# Patient Record
Sex: MALE | Race: WHITE | NOT HISPANIC OR LATINO | Employment: FULL TIME | URBAN - NONMETROPOLITAN AREA
[De-identification: names, ages, dates, MRNs, and addresses within clinical notes are randomized per-mention and may not be internally consistent; named-entity substitution may affect disease eponyms.]

---

## 2022-04-09 ENCOUNTER — OFFICE VISIT (OUTPATIENT)
Dept: FAMILY MEDICINE CLINIC | Facility: CLINIC | Age: 57
End: 2022-04-09

## 2022-04-09 VITALS
TEMPERATURE: 97.8 F | BODY MASS INDEX: 21.7 KG/M2 | SYSTOLIC BLOOD PRESSURE: 124 MMHG | OXYGEN SATURATION: 98 % | WEIGHT: 169.1 LBS | HEIGHT: 74 IN | HEART RATE: 71 BPM | DIASTOLIC BLOOD PRESSURE: 82 MMHG

## 2022-04-09 DIAGNOSIS — L98.9 SKIN LESION: Primary | ICD-10-CM

## 2022-04-09 PROBLEM — Z87.442 HISTORY OF URINARY STONE: Status: ACTIVE | Noted: 2020-07-16

## 2022-04-09 PROCEDURE — 99213 OFFICE O/P EST LOW 20 MIN: CPT | Performed by: FAMILY MEDICINE

## 2022-04-09 NOTE — PROGRESS NOTES
Date: 2022   Patient Name: Davey Ruiz  : 1965   MRN: [id number]     Chief Complaint:    Chief Complaint   Patient presents with   • Rash     Left upper thigh, skin lesion, patient noticed 2 days ago       History of Present Illness: Davey Ruiz is a 56 y.o. male who is here today for And lesion.  He reports that he first noticed it being tender and rubbing on his close 3 days ago.  It is a fleshy pedunculated mole with a black center that appears to be a scab.  He denies known insect or tick bite.  He denies drainage or surrounding erythema.           Review of Systems:   Review of Systems   Constitutional: Negative for fatigue.   Eyes: Negative for blurred vision.   Respiratory: Negative for cough, chest tightness and shortness of breath.    Cardiovascular: Negative for chest pain.   Gastrointestinal: Negative for abdominal pain, constipation, diarrhea, nausea and vomiting.   Skin: Positive for skin lesions. Negative for rash and wound.   Neurological: Negative for dizziness, tremors and headache.   Psychiatric/Behavioral: Negative for depressed mood. The patient is not nervous/anxious.        Past Medical History: History reviewed. No pertinent past medical history.    Past Surgical History:   Past Surgical History:   Procedure Laterality Date   • COLONOSCOPY         Family History:   Family History   Problem Relation Age of Onset   • Alzheimer's disease Mother    • Skin cancer Mother    • Colon cancer Father        Social History:   Social History     Socioeconomic History   • Marital status:      Spouse name: Tata   Tobacco Use   • Smoking status: Never Smoker   • Smokeless tobacco: Never Used   Vaping Use   • Vaping Use: Never used   Substance and Sexual Activity   • Alcohol use: Never   • Drug use: Never   • Sexual activity: Defer       Medications:   No current outpatient medications on file.    Allergies:   No Known Allergies      Physical Exam:  Vital Signs:   Vitals:     "04/09/22 0906   BP: 124/82   BP Location: Right arm   Patient Position: Sitting   Cuff Size: Adult   Pulse: 71   Temp: 97.8 °F (36.6 °C)   TempSrc: Temporal   SpO2: 98%   Weight: 76.7 kg (169 lb 1.6 oz)   Height: 188 cm (74\")     Body mass index is 21.71 kg/m².     Physical Exam  Vitals and nursing note reviewed.   Constitutional:       Appearance: Normal appearance.   HENT:      Head: Normocephalic.   Eyes:      Conjunctiva/sclera: Conjunctivae normal.   Pulmonary:      Effort: Pulmonary effort is normal.   Skin:     General: Skin is warm.      Comments: Fleshy pedunculated mole on left proximal inner thigh 2 mm in diameter with central black scabbing.  No purulent drainage or surrounding erythema.   Neurological:      Mental Status: He is alert and oriented to person, place, and time.   Psychiatric:         Mood and Affect: Mood normal.         Behavior: Behavior normal.           Assessment/Plan:   Diagnoses and all orders for this visit:    1. Skin lesion (Primary)  Assessment & Plan:  Unclear etiology but appears to be a fleshy mole with central excoriation potentially from rubbing on clothes.  Used silver nitrate stick today.  If no improvement by early next week patient will come back in for shaving of the lesion.           Follow Up:   Return if symptoms worsen or fail to improve.    Veronica Claire, DO  [facility]    "

## 2022-04-09 NOTE — ASSESSMENT & PLAN NOTE
Unclear etiology but appears to be a fleshy mole with central excoriation potentially from rubbing on clothes.  Used silver nitrate stick today.  If no improvement by early next week patient will come back in for shaving of the lesion.

## 2022-07-23 ENCOUNTER — OFFICE VISIT (OUTPATIENT)
Dept: FAMILY MEDICINE CLINIC | Facility: CLINIC | Age: 57
End: 2022-07-23

## 2022-07-23 VITALS
SYSTOLIC BLOOD PRESSURE: 120 MMHG | OXYGEN SATURATION: 99 % | WEIGHT: 159.2 LBS | DIASTOLIC BLOOD PRESSURE: 74 MMHG | HEIGHT: 74 IN | BODY MASS INDEX: 20.43 KG/M2 | HEART RATE: 72 BPM

## 2022-07-23 DIAGNOSIS — Q82.9 SKIN ANOMALY: Primary | ICD-10-CM

## 2022-07-23 PROCEDURE — 99213 OFFICE O/P EST LOW 20 MIN: CPT | Performed by: FAMILY MEDICINE

## 2022-07-23 NOTE — PROGRESS NOTES
"Chief Complaint  Suspicious Skin Lesion (Left leg)    Subjective          Davey Ruiz presents to [facility]  Patient comes in today saying that he has a spot on his upper leg that was treated few months ago he said got better so he went away and then it began to come back he said few days ago he was playing tennis and then noticed afterward it started turning black and becoming painful he denies any other diffident problems at this time      Objective   Vital Signs:   /74   Pulse 72   Ht 188 cm (74\")   Wt 72.2 kg (159 lb 3.2 oz)   SpO2 99%   BMI 20.44 kg/m²     Body mass index is 20.44 kg/m².    Review of Systems   Constitutional: Negative.    HENT: Negative for congestion, dental problem, ear discharge, ear pain and sore throat.    Respiratory: Negative for apnea, chest tightness and shortness of breath.    Gastrointestinal: Negative for constipation and nausea.   Endocrine: Negative for polyuria.   Genitourinary: Negative for difficulty urinating.   Musculoskeletal: Negative for arthralgias and gait problem.   Skin: Positive for skin lesions. Negative for rash.   Hematological: Negative for adenopathy.       Past History:  Medical History: has a past medical history of Nephrolithiasis.   Surgical History: has a past surgical history that includes Colonoscopy.       No current outpatient medications on file.    Allergies: Patient has no known allergies.    Physical Exam  Vitals reviewed.   Constitutional:       Appearance: Normal appearance.   HENT:      Head: Normocephalic.      Right Ear: External ear normal.      Left Ear: External ear normal.      Nose: Nose normal.   Eyes:      Pupils: Pupils are equal, round, and reactive to light.   Cardiovascular:      Rate and Rhythm: Normal rate and regular rhythm.      Pulses: Normal pulses.   Pulmonary:      Effort: Pulmonary effort is normal.      Breath sounds: Normal breath sounds.   Abdominal:      General: Abdomen is " flat. Bowel sounds are normal.      Palpations: Abdomen is soft.   Musculoskeletal:         General: Normal range of motion.   Skin:     General: Skin is warm and dry.      Comments: Patient has a partially auto necrosis skin tag of left upper thigh   Neurological:      General: No focal deficit present.      Mental Status: He is alert and oriented to person, place, and time.          Result Review :                   Assessment and Plan    Diagnoses and all orders for this visit:    1. Skin anomaly (Primary)  Comments:  Peers to be an irritated skin tag that is auto necrosing.  Discussed treatments and will monitor and will use antibiotic cream if not completely gone in 1 month              Follow Up   Return if symptoms worsen or fail to improve.  Patient was given instructions and counseling regarding his condition or for health maintenance advice. Please see specific information pulled into the AVS if appropriate.     Amos Mendoza MD

## 2024-05-23 PROBLEM — H92.03 OTALGIA OF BOTH EARS: Status: ACTIVE | Noted: 2024-05-23
